# Patient Record
Sex: FEMALE | Race: WHITE | NOT HISPANIC OR LATINO | URBAN - METROPOLITAN AREA
[De-identification: names, ages, dates, MRNs, and addresses within clinical notes are randomized per-mention and may not be internally consistent; named-entity substitution may affect disease eponyms.]

---

## 2017-01-31 ENCOUNTER — OFFICE VISIT - HEALTHEAST (OUTPATIENT)
Dept: FAMILY MEDICINE | Facility: CLINIC | Age: 27
End: 2017-01-31

## 2017-01-31 DIAGNOSIS — H53.8 BLURRY VISION, LEFT EYE: ICD-10-CM

## 2021-05-30 VITALS — WEIGHT: 110.6 LBS

## 2021-06-08 NOTE — PROGRESS NOTES
Assessment:     1. Blurry vision, left eye       Normal neuro and vision exam. No eye pain or redness. Normal snellen test currently.      Plan:   Blurry vision  Normal neuro exam  Schedule an appt with an eye doctor in 1 -2 days for complete eye evaluation  Go to the ER if you begin to have recurrent symptoms or any new or worsening symptoms.       Subjective:       Aysha Castanon is a 26 y.o. female who with mom presents for evaluation of blurry vision.      Today as she got to work she had an episode of vision changes and lightheadedness. She states her left eye had a line of blurry vision across the center of it with a bright light above and below the line. It resolved slowly over about 25 min. She has never had a similar episodes previously.     She has a HA currently but not at the time.     She uses [employer] at night for dry eyes. Occasional twitching in right eye. She denies any eye erythema, pain, discharge, sensitivity to light, vision change currently.    Last eye exam was about 1.5 yrs ago. Wears contacts and glasses.     Denies any fever, chills, URI symptoms, speech changes, lightheadedness, dizziness, gait instability, weakness, paresthesia, chest pain, SOB, dyspnea, abd pain, n/v.     No PMD.     No medication changes.    Family hx: mom with MS, dad with HTN    Review of Systems  Pertinent items are noted in HPI.      Objective:        Visit Vitals     /70     Pulse 83     Temp 97.1  F (36.2  C) (Oral)     Wt 110 lb 9.6 oz (50.2 kg)     LMP 03/28/2013     SpO2 97%     General appearance: alert, appears stated age and cooperative  Head: Normocephalic, without obvious abnormality, atraumatic  Eyes: conjunctivae/corneas clear. PERRL, EOM's intact. Fundi benign. wearing glasses. Snellen in nurses notes.   Ears: normal TM's and external ear canals both ears  Nose: Nares normal. Septum midline. Mucosa normal. No drainage or sinus tenderness.  Throat: lips, mucosa, and tongue normal; teeth and gums  normal  Neck: no adenopathy and supple, symmetrical, trachea midline  Lungs: clear to auscultation bilaterally  Heart: regular rate and rhythm, S1, S2 normal, no murmur, click, rub or gallop  Skin: Skin color, texture, turgor normal. No rashes or lesions  Neurologic: Alert and oriented X 3, normal strength and tone. Normal symmetric reflexes. Normal coordination and gait, CN II - XII grossly intact, neg Romberg, no pronator drift.